# Patient Record
Sex: FEMALE | Race: WHITE | NOT HISPANIC OR LATINO | URBAN - METROPOLITAN AREA
[De-identification: names, ages, dates, MRNs, and addresses within clinical notes are randomized per-mention and may not be internally consistent; named-entity substitution may affect disease eponyms.]

---

## 2017-11-15 ENCOUNTER — EMERGENCY (EMERGENCY)
Facility: HOSPITAL | Age: 42
LOS: 1 days | Discharge: HOME | End: 2017-11-15
Admitting: EMERGENCY MEDICINE
Payer: COMMERCIAL

## 2017-11-15 VITALS
HEART RATE: 84 BPM | RESPIRATION RATE: 16 BRPM | SYSTOLIC BLOOD PRESSURE: 132 MMHG | HEIGHT: 69 IN | OXYGEN SATURATION: 99 % | TEMPERATURE: 98 F | DIASTOLIC BLOOD PRESSURE: 83 MMHG | WEIGHT: 193.35 LBS

## 2017-11-15 DIAGNOSIS — Z79.2 LONG TERM (CURRENT) USE OF ANTIBIOTICS: ICD-10-CM
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
## 2017-11-15 DIAGNOSIS — S39.012A STRAIN OF MUSCLE, FASCIA AND TENDON OF LOWER BACK, INITIAL ENCOUNTER: ICD-10-CM
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
## 2017-11-15 DIAGNOSIS — M54.5 LOW BACK PAIN: ICD-10-CM

## 2017-11-15 DIAGNOSIS — F17.200 NICOTINE DEPENDENCE, UNSPECIFIED, UNCOMPLICATED: ICD-10-CM

## 2017-11-15 PROCEDURE — 99283 EMERGENCY DEPT VISIT LOW MDM: CPT

## 2017-11-15 RX ORDER — CYCLOBENZAPRINE HYDROCHLORIDE 10 MG/1
10 TABLET, FILM COATED ORAL ONCE
Qty: 0 | Refills: 0 | Status: COMPLETED | OUTPATIENT
Start: 2017-11-15 | End: 2017-11-15

## 2017-11-15 RX ORDER — CYCLOBENZAPRINE HYDROCHLORIDE 10 MG/1
1 TABLET, FILM COATED ORAL
Qty: 10 | Refills: 0 | OUTPATIENT
Start: 2017-11-15

## 2017-11-15 RX ADMIN — CYCLOBENZAPRINE HYDROCHLORIDE 10 MILLIGRAM(S): 10 TABLET, FILM COATED ORAL at 17:31

## 2017-11-15 NOTE — ED PROVIDER NOTE - OBJECTIVE STATEMENT
Pt with Hx herniated LS disc several yrs ago today had sudden onset pain in right LS region radiating to right buttock when she bent down to  an item from the ground.  Took ibu PTA with some relief.  No leg weakness, saddle anesthesia, bladder/bowel dysfunction, fever or chills, steroid use, unexplained weight loss.  No hx of marfan/aaa.  Past herniated disc tx with NSAIDs and flexeril, which helped significantly.

## 2017-11-15 NOTE — ED ADULT NURSE NOTE - OBJECTIVE STATEMENT
Pt presents to ED c/o back pain since today. Pt with PMH disc herniation presents today with back pain s/p bending over to pick something up today at 2pm. Pt denies associated HA, neck pain, numbness/tingling, loss of continence. Pt presents in NAD speaking full sentences ambulatory through triage.

## 2017-11-15 NOTE — ED PROVIDER NOTE - MEDICAL DECISION MAKING DETAILS
Pt with Hx herniated LS disc several yrs ago today had sudden onset pain in right LS region radiating to right buttock when she bent down to  an item from the ground.  No leg weakness, saddle anesthesia, bladder/bowel dysfunction, TUNA FISH negative.  Able to walk and chnge position without much difficulty.  She already took dle613 mg PTA, will give a dose of muscle relaxant here, and DC home on same.

## 2017-11-15 NOTE — ED PROVIDER NOTE - PHYSICAL EXAMINATION
CONSTITUTIONAL: WD,WN. NAD.    SKIN: Normal color and turgor. No rash.    HEAD: NC/AT.  EYES: Conjunctiva clear. EOMI. PERRL.    ENT: Airway patent, OP clear. Nasal mucosa clear, no rhinorrhea.   RESPIRATORY:  Breathing non-labored. No retractions or accessory muscle use.  Lungs CTA bilat.  CARDIOVASCULAR:  RRR, S1S2. No M/R/G.      GI:  Abdomen soft, nontender.  No pulsatile abd mass.  MSK: Neck supple with painless ROM.  No extremity edema or tenderness.  No joint swelling or ROM limitation.  Able to change position and walk without apparent difficulty.    NEURO: Alert and oriented; NAIR with normal strength.  SLR negative.  Normal speech and gait. DTR quads, ankles +2 bilat.  Normal rectal tone (PA-S)

## 2017-11-25 ENCOUNTER — EMERGENCY (EMERGENCY)
Facility: HOSPITAL | Age: 42
LOS: 1 days | Discharge: HOME | End: 2017-11-25
Attending: EMERGENCY MEDICINE | Admitting: EMERGENCY MEDICINE
Payer: COMMERCIAL

## 2017-11-25 VITALS
SYSTOLIC BLOOD PRESSURE: 122 MMHG | HEART RATE: 94 BPM | RESPIRATION RATE: 18 BRPM | OXYGEN SATURATION: 97 % | DIASTOLIC BLOOD PRESSURE: 77 MMHG

## 2017-11-25 VITALS
SYSTOLIC BLOOD PRESSURE: 144 MMHG | TEMPERATURE: 98 F | HEART RATE: 124 BPM | RESPIRATION RATE: 17 BRPM | DIASTOLIC BLOOD PRESSURE: 86 MMHG | OXYGEN SATURATION: 99 % | WEIGHT: 192.9 LBS | HEIGHT: 69 IN

## 2017-11-25 DIAGNOSIS — M54.5 LOW BACK PAIN: ICD-10-CM

## 2017-11-25 DIAGNOSIS — Z79.899 OTHER LONG TERM (CURRENT) DRUG THERAPY: ICD-10-CM

## 2017-11-25 DIAGNOSIS — F17.200 NICOTINE DEPENDENCE, UNSPECIFIED, UNCOMPLICATED: ICD-10-CM

## 2017-11-25 PROCEDURE — 96372 THER/PROPH/DIAG INJ SC/IM: CPT

## 2017-11-25 PROCEDURE — 99283 EMERGENCY DEPT VISIT LOW MDM: CPT | Mod: 25

## 2017-11-25 PROCEDURE — 99284 EMERGENCY DEPT VISIT MOD MDM: CPT

## 2017-11-25 RX ORDER — DIAZEPAM 5 MG
1 TABLET ORAL
Qty: 9 | Refills: 0 | OUTPATIENT
Start: 2017-11-25 | End: 2017-11-28

## 2017-11-25 RX ORDER — KETOROLAC TROMETHAMINE 30 MG/ML
60 SYRINGE (ML) INJECTION ONCE
Qty: 0 | Refills: 0 | Status: DISCONTINUED | OUTPATIENT
Start: 2017-11-25 | End: 2017-11-25

## 2017-11-25 RX ORDER — DIAZEPAM 5 MG
5 TABLET ORAL ONCE
Qty: 0 | Refills: 0 | Status: DISCONTINUED | OUTPATIENT
Start: 2017-11-25 | End: 2017-11-25

## 2017-11-25 RX ORDER — TRAMADOL HYDROCHLORIDE 50 MG/1
1 TABLET ORAL
Qty: 18 | Refills: 0 | OUTPATIENT
Start: 2017-11-25 | End: 2017-11-28

## 2017-11-25 RX ADMIN — Medication 60 MILLIGRAM(S): at 18:23

## 2017-11-25 RX ADMIN — Medication 5 MILLIGRAM(S): at 18:06

## 2017-11-25 RX ADMIN — Medication 60 MILLIGRAM(S): at 18:06

## 2017-11-25 NOTE — ED PROVIDER NOTE - MEDICAL DECISION MAKING DETAILS
Pt p/w low back pain c/w lumbosacral radiculopathy / sciatica. There is no midline ttp; no neurologic signs or symptoms on history or exam; no numbness/tingling or paresthesias; gait is normal, no bowel or bladder incontinence; no evidence of cord compression or cauda equina syndrome; in addition, there is no fever and no risk factors or evidence of epidural abscess; no evidence of cord transection or metastatic process or other acute spinal cord injury.  Patient has a normal neuro exam and back exam; no evidence of AAA/dissection. Given pt already on NSAIDs, muscle relaxants, and opiates, will likely add steroid taper. Pt instructed to call MERISSA Owusu on Monday to help obtain spine appointment. Pt given Toradol and Valium in the ED, will re-assess. Dispo pending w/u and clinical status

## 2017-11-25 NOTE — ED PROVIDER NOTE - PROGRESS NOTE DETAILS
Pt looked up on  - This report was requested by: Shelli Singh | Reference #: [id number]. Percocet Rx x 4 days given 11/18/17, and in 2016. Only other Rx x 2 for phentermine Pt feeling better. Pt able to ambulate. Continue NSAIDs. Change Percocet to Tramadol, Cyclobenzaprine to Valium. F/u Spine. Return precautions given.

## 2017-11-25 NOTE — ED PROVIDER NOTE - MUSCULOSKELETAL, MLM
Spine appears normal, range of motion is not limited, no midline spinal ttp throughout the spine. + R paraspinal mm ttp in the lumbar and sacral regions, an in the R buttock.

## 2017-11-25 NOTE — ED PROVIDER NOTE - CARE PLAN
Principal Discharge DX:	Right low back pain, unspecified chronicity, with sciatica presence unspecified

## 2017-11-25 NOTE — ED PROVIDER NOTE - NEUROLOGICAL, MLM
Alert and oriented, no focal deficits, no motor or sensory deficits. 5/5 muscle strength in all muscle grounps in b/l LE. normal sensation. EHL's 5/5. no saddle anesthesia. + SLR b/l

## 2017-11-25 NOTE — ED PROVIDER NOTE - OBJECTIVE STATEMENT
Pt w/ PMHx L4-5, L5-S1 herniated disk, p/w R lower back pain x 10 days. Pain began s/p picking up and wrapper 10 days ago. Pt came to the ED at that time and was given Rx for cyclobenzaprine. Pt wait to an OSH 3 days later and was prescribed Percocet. Pt also has been taking Motrin. Pt took all 3 medications at 9 am this morning w/o relief of pain, no analgesia since then. The pain is located in the R lower back and buttock. The pain is non radiating. No paresthesias or LE weakness. No saddle anesthesia. No bowel or bladder incontinence. + normal gait w/ pain. No f/c. No recent trauma. No IVDA. no hx malignancy. Pt has been unable to get an appointment with spine. Pt reports pain is similar to prior herniated disk, although this is not as severe as that was.

## 2017-11-25 NOTE — ED ADULT NURSE NOTE - OBJECTIVE STATEMENT
Pt presents to ED c/o back pain x10 days. Pt was seen in [facility] ED x10 days ago for back pain after bending over to pick something up, rx'ed flexeril and dc'ed. Pt reports since then she has been taking flexeril, ibuprofen, and percocet without relief, last dose took all 3 medications at 9 am today. Pt reports 7/10 pain to mid lower back radiating to R buttocks with lying down, 10/10 pain with walking or sitting down. Pt denies numbness/tingling, bowel/bladder incontinence. Pt presents in NAD though tearful speaking full sentences ambulatory through triage.

## 2017-11-25 NOTE — ED ADULT TRIAGE NOTE - OTHER COMPLAINTS
pt c.o lower back pain with radiation to R buttock x 10 days s/p injury. c.o worsening pain without relief from meds.

## 2021-07-27 NOTE — ED PROVIDER NOTE - CONSTITUTIONAL, MLM
no normal... Well appearing, well nourished, awake, alert, oriented to person, place, time/situation and in no apparent distress.

## 2021-10-13 NOTE — ED ADULT TRIAGE NOTE - RESPIRATORY RATE (BREATHS/MIN)
17
hx of headaches that have been worsening over the past few months presented to her neurologist Dr. Victoria who ordered an MRI for evaluation. MRI was consistent with L frontal meningioma and was referred to neuro IR for cerebral angiogram. Angiogram was performed on 10/11

## 2023-07-04 NOTE — ED ADULT NURSE NOTE - NS ED NURSE LEVEL OF CONSCIOUSNESS MENTAL STATUS
Bleeding that does not stop/Swelling that gets worse/Pain not relieved by Medications/Fever greater than (need to indicate Fahrenheit or Celsius)/Wound/Surgical Site with redness, or foul smelling discharge or pus/Numbness, tingling, color or temperature change to extremity
Alert/Awake/Cooperative
